# Patient Record
Sex: FEMALE | Race: WHITE | URBAN - METROPOLITAN AREA
[De-identification: names, ages, dates, MRNs, and addresses within clinical notes are randomized per-mention and may not be internally consistent; named-entity substitution may affect disease eponyms.]

---

## 2022-01-04 ENCOUNTER — PREPPED CHART (OUTPATIENT)
Dept: URBAN - METROPOLITAN AREA CLINIC 101 | Facility: CLINIC | Age: 62
End: 2022-01-04

## 2022-01-04 PROBLEM — H43.813 POSTERIOR VITREOUS DETACHMENT: Noted: 2022-01-04

## 2022-01-04 PROBLEM — H35.373 EPIRETINAL MEMBRANE: Noted: 2022-01-04

## 2022-01-04 PROBLEM — H43.393 VITREOUS OPACITIES, OTHER: Noted: 2022-01-04

## 2022-01-04 PROBLEM — H35.341 MACULAR HOLE: Noted: 2022-01-04

## 2022-03-01 ASSESSMENT — VISUAL ACUITY
OD_SC: 20/20-1
OS_SC: 20/20-2

## 2022-03-01 ASSESSMENT — TONOMETRY
OS_IOP_MMHG: 13
OD_IOP_MMHG: 12

## 2022-03-07 ENCOUNTER — FOLLOW UP (OUTPATIENT)
Dept: URBAN - METROPOLITAN AREA CLINIC 101 | Facility: CLINIC | Age: 62
End: 2022-03-07

## 2022-03-07 DIAGNOSIS — H35.373: ICD-10-CM

## 2022-03-07 DIAGNOSIS — H35.341: ICD-10-CM

## 2022-03-07 DIAGNOSIS — H43.813: ICD-10-CM

## 2022-03-07 PROCEDURE — 99214 OFFICE O/P EST MOD 30 MIN: CPT

## 2022-03-07 PROCEDURE — 92134 CPTRZ OPH DX IMG PST SGM RTA: CPT

## 2022-03-07 PROCEDURE — 92202 OPSCPY EXTND ON/MAC DRAW: CPT

## 2022-03-07 ASSESSMENT — VISUAL ACUITY
OS_CC: 20/25+1
OD_CC: 20/20

## 2022-03-07 ASSESSMENT — TONOMETRY
OD_IOP_MMHG: 13
OS_IOP_MMHG: 14

## 2022-04-07 ENCOUNTER — APPOINTMENT (OUTPATIENT)
Age: 62
Setting detail: DERMATOLOGY
End: 2022-04-07

## 2022-04-07 DIAGNOSIS — L81.4 OTHER MELANIN HYPERPIGMENTATION: ICD-10-CM

## 2022-04-07 DIAGNOSIS — L82.1 OTHER SEBORRHEIC KERATOSIS: ICD-10-CM

## 2022-04-07 DIAGNOSIS — L91.8 OTHER HYPERTROPHIC DISORDERS OF THE SKIN: ICD-10-CM

## 2022-04-07 DIAGNOSIS — Z71.89 OTHER SPECIFIED COUNSELING: ICD-10-CM

## 2022-04-07 DIAGNOSIS — D22 MELANOCYTIC NEVI: ICD-10-CM | Status: STABLE

## 2022-04-07 PROBLEM — D22.4 MELANOCYTIC NEVI OF SCALP AND NECK: Status: ACTIVE | Noted: 2022-04-07

## 2022-04-07 PROCEDURE — ? COUNSELING

## 2022-04-07 PROCEDURE — 99203 OFFICE O/P NEW LOW 30 MIN: CPT

## 2022-04-07 PROCEDURE — ? DIAGNOSIS COMMENT

## 2022-04-07 PROCEDURE — ? RECOMMENDATIONS

## 2022-04-07 PROCEDURE — ? PHOTO-DOCUMENTATION

## 2022-04-07 PROCEDURE — ? OBSERVATION

## 2022-04-07 PROCEDURE — ? SUNSCREEN RECOMMENDATIONS

## 2022-04-07 ASSESSMENT — PAIN INTENSITY VAS: HOW INTENSE IS YOUR PAIN 0 BEING NO PAIN, 10 BEING THE MOST SEVERE PAIN POSSIBLE?: NO PAIN

## 2022-04-07 ASSESSMENT — LOCATION DETAILED DESCRIPTION DERM
LOCATION DETAILED: SUBXIPHOID
LOCATION DETAILED: LEFT SUPERIOR OCCIPITAL SCALP
LOCATION DETAILED: LEFT CENTRAL MALAR CHEEK

## 2022-04-07 ASSESSMENT — LOCATION ZONE DERM
LOCATION ZONE: SCALP
LOCATION ZONE: TRUNK
LOCATION ZONE: FACE

## 2022-04-07 ASSESSMENT — LOCATION SIMPLE DESCRIPTION DERM
LOCATION SIMPLE: ABDOMEN
LOCATION SIMPLE: LEFT CHEEK
LOCATION SIMPLE: POSTERIOR SCALP

## 2022-04-07 NOTE — PROCEDURE: PHOTO-DOCUMENTATION
Detail Level: Detailed
Details (Free Text): 3 photos of left cheek
Photo Preface (Leave Blank If You Do Not Want): Photographs were obtained today

## 2022-04-07 NOTE — PROCEDURE: REASSURANCE
Hide Additional Notes?: No
Detail Level: Zone
Additional Note: Pt was reassured that no malignant characteristics observed with the dermatoscope today. Pt instructed to periodically rtc for reassessment.
Additional Note: Pt was reassured that the nevus appears benign under the dermatoscope today. She should monitor the lesion for changes, and rtc for reassessment if any are noted.
Additional Note: Pt was reassured that the lesions are benign with no malignant potential. She was informed about treatment with LN2 should they become symptomatic.

## 2022-04-07 NOTE — PROCEDURE: RECOMMENDATIONS
Recommendations (Free Text): Annual FBSE
Recommendations (Free Text): Series of Excel V laser treatments with Dr. Fierro (minimum 3 sessions for optimal results)\\n\\n**pt was quoted $250 per session for cosmetic treatment with laser
Render Risk Assessment In Note?: no
Detail Level: Zone
Recommendation Preamble: The following recommendations were made during the visit:

## 2022-04-07 NOTE — PROCEDURE: DIAGNOSIS COMMENT
Render Risk Assessment In Note?: no
Comment: Pt believes that the lesion has recently enlarged and darkened. She has also noticed more redness around the lesion. Pt is concerned about possible malignancy (her brother-in-law passed from MM).
Detail Level: Simple

## 2022-07-11 ENCOUNTER — FOLLOW UP (OUTPATIENT)
Dept: URBAN - METROPOLITAN AREA CLINIC 101 | Facility: CLINIC | Age: 62
End: 2022-07-11

## 2022-07-11 DIAGNOSIS — H35.373: ICD-10-CM

## 2022-07-11 DIAGNOSIS — H43.813: ICD-10-CM

## 2022-07-11 DIAGNOSIS — H43.393: ICD-10-CM

## 2022-07-11 PROCEDURE — 92134 CPTRZ OPH DX IMG PST SGM RTA: CPT

## 2022-07-11 PROCEDURE — 92201 OPSCPY EXTND RTA DRAW UNI/BI: CPT

## 2022-07-11 PROCEDURE — 92014 COMPRE OPH EXAM EST PT 1/>: CPT

## 2022-07-11 ASSESSMENT — VISUAL ACUITY
OD_CC: 20/20-2
OS_CC: 20/20

## 2022-07-11 ASSESSMENT — TONOMETRY
OD_IOP_MMHG: 11
OS_IOP_MMHG: 12

## 2023-01-24 ENCOUNTER — FOLLOW UP (OUTPATIENT)
Dept: URBAN - METROPOLITAN AREA CLINIC 101 | Facility: CLINIC | Age: 63
End: 2023-01-24

## 2023-01-24 DIAGNOSIS — H43.813: ICD-10-CM

## 2023-01-24 DIAGNOSIS — H35.373: ICD-10-CM

## 2023-01-24 PROCEDURE — 92202 OPSCPY EXTND ON/MAC DRAW: CPT

## 2023-01-24 PROCEDURE — 92014 COMPRE OPH EXAM EST PT 1/>: CPT

## 2023-01-24 PROCEDURE — 92134 CPTRZ OPH DX IMG PST SGM RTA: CPT

## 2023-01-24 ASSESSMENT — TONOMETRY
OS_IOP_MMHG: 15
OD_IOP_MMHG: 13

## 2023-01-24 ASSESSMENT — VISUAL ACUITY
OD_CC: 20/20-2
OS_CC: 20/20

## 2023-07-24 ENCOUNTER — 6 MONTH FOLLOW UP (OUTPATIENT)
Dept: URBAN - METROPOLITAN AREA CLINIC 101 | Facility: CLINIC | Age: 63
End: 2023-07-24

## 2023-07-24 DIAGNOSIS — H35.373: ICD-10-CM

## 2023-07-24 DIAGNOSIS — H43.813: ICD-10-CM

## 2023-07-24 DIAGNOSIS — H35.343: ICD-10-CM

## 2023-07-24 DIAGNOSIS — H25.13: ICD-10-CM

## 2023-07-24 PROCEDURE — 92014 COMPRE OPH EXAM EST PT 1/>: CPT

## 2023-07-24 PROCEDURE — 92134 CPTRZ OPH DX IMG PST SGM RTA: CPT

## 2023-07-24 PROCEDURE — 92202 OPSCPY EXTND ON/MAC DRAW: CPT

## 2023-07-24 ASSESSMENT — TONOMETRY
OD_IOP_MMHG: 15
OS_IOP_MMHG: 16

## 2023-07-24 ASSESSMENT — VISUAL ACUITY
OS_CC: 20/20
OD_CC: 20/20

## 2024-01-29 ENCOUNTER — 6 MONTH FOLLOW UP (OUTPATIENT)
Dept: URBAN - METROPOLITAN AREA CLINIC 101 | Facility: CLINIC | Age: 64
End: 2024-01-29

## 2024-01-29 DIAGNOSIS — H25.13: ICD-10-CM

## 2024-01-29 DIAGNOSIS — H35.373: ICD-10-CM

## 2024-01-29 DIAGNOSIS — H35.343: ICD-10-CM

## 2024-01-29 DIAGNOSIS — H43.813: ICD-10-CM

## 2024-01-29 PROCEDURE — 92134 CPTRZ OPH DX IMG PST SGM RTA: CPT

## 2024-01-29 PROCEDURE — 92202 OPSCPY EXTND ON/MAC DRAW: CPT

## 2024-01-29 PROCEDURE — 92014 COMPRE OPH EXAM EST PT 1/>: CPT

## 2024-01-29 ASSESSMENT — VISUAL ACUITY
OD_CC: 20/20
OS_CC: 20/20

## 2024-01-29 ASSESSMENT — TONOMETRY
OD_IOP_MMHG: 16
OS_IOP_MMHG: 15

## 2025-01-31 ENCOUNTER — FOLLOW UP (OUTPATIENT)
Dept: URBAN - METROPOLITAN AREA CLINIC 101 | Facility: CLINIC | Age: 65
End: 2025-01-31

## 2025-01-31 DIAGNOSIS — H43.813: ICD-10-CM

## 2025-01-31 DIAGNOSIS — H35.343: ICD-10-CM

## 2025-01-31 DIAGNOSIS — H35.373: ICD-10-CM

## 2025-01-31 DIAGNOSIS — H25.13: ICD-10-CM

## 2025-01-31 PROCEDURE — 92202 OPSCPY EXTND ON/MAC DRAW: CPT

## 2025-01-31 PROCEDURE — 92014 COMPRE OPH EXAM EST PT 1/>: CPT

## 2025-01-31 PROCEDURE — 92134 CPTRZ OPH DX IMG PST SGM RTA: CPT

## 2025-01-31 ASSESSMENT — TONOMETRY
OD_IOP_MMHG: 18
OS_IOP_MMHG: 17

## 2025-01-31 ASSESSMENT — VISUAL ACUITY
OD_CC: 20/20-1
OS_CC: 20/20-1